# Patient Record
Sex: FEMALE | Race: BLACK OR AFRICAN AMERICAN | NOT HISPANIC OR LATINO | Employment: FULL TIME | URBAN - METROPOLITAN AREA
[De-identification: names, ages, dates, MRNs, and addresses within clinical notes are randomized per-mention and may not be internally consistent; named-entity substitution may affect disease eponyms.]

---

## 2018-01-23 ENCOUNTER — OFFICE VISIT (OUTPATIENT)
Dept: OBSTETRICS AND GYNECOLOGY | Facility: CLINIC | Age: 33
End: 2018-01-23
Payer: COMMERCIAL

## 2018-01-23 VITALS
WEIGHT: 293 LBS | BODY MASS INDEX: 44.41 KG/M2 | HEIGHT: 68 IN | SYSTOLIC BLOOD PRESSURE: 118 MMHG | DIASTOLIC BLOOD PRESSURE: 78 MMHG

## 2018-01-23 DIAGNOSIS — R87.612 LGSIL ON PAP SMEAR OF CERVIX: ICD-10-CM

## 2018-01-23 PROCEDURE — 99999 PR PBB SHADOW E&M-NEW PATIENT-LVL II: CPT | Mod: PBBFAC,,, | Performed by: OBSTETRICS & GYNECOLOGY

## 2018-01-23 PROCEDURE — 99203 OFFICE O/P NEW LOW 30 MIN: CPT | Mod: S$GLB,,, | Performed by: OBSTETRICS & GYNECOLOGY

## 2018-01-23 RX ORDER — LISINOPRIL 20 MG/1
TABLET ORAL
COMMUNITY
Start: 2017-11-21

## 2018-01-23 NOTE — PROGRESS NOTES
Subjective:       Patient ID: Sidney Cardona is a 32 y.o. female.    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   Last exam 2018.   Implant for birth control, not interested in fertility   Discussed options after review of medical records.   Will proceed with LEEP     GYN & OB History  No LMP recorded. Patient is not currently having periods (Reason: Birth Control).   Date of Last Pap: No result found    OB History    Para Term  AB Living   0 0 0 0 0 0   SAB TAB Ectopic Multiple Live Births   0 0 0 0 0             Review of Systems  Review of Systems        Objective:    Physical Exam       Assessment:        1. LGSIL on Pap smear of cervix                Plan:      LEEP near future

## 2018-01-23 NOTE — LETTER
January 23, 2018      Shan Moore MD  [address] [place] [address]           [facility]  [address] [address] [address]  Phone: [phone]  Fax: [phone]          Patient: Sidney Cardona   MR Number: [id number]   YOB: 1985   Date of Visit: 1/23/2018       Dear Dr. Shan Moore:    Thank you for referring Sidney Cardona to me for evaluation. Attached you will find relevant portions of my assessment and plan of care.    If you have questions, please do not hesitate to call me. I look forward to following Sidney Cardona along with you.    Sincerely,    PIPO Quinn MD    Enclosure  CC:  No Recipients    If you would like to receive this communication electronically, please contact [email or web address] or [phone] to request more information on [employer] access.    For providers and/or their staff who would like to refer a patient to [facility], please contact us through our one-stop-shop provider referral line, [facility], at [phone].    If you feel you have received this communication in error or would no longer like to receive these types of communications, please e-mail [email or web address]

## 2018-02-02 ENCOUNTER — PROCEDURE VISIT (OUTPATIENT)
Dept: OBSTETRICS AND GYNECOLOGY | Facility: CLINIC | Age: 33
End: 2018-02-02
Payer: COMMERCIAL

## 2018-02-02 VITALS
SYSTOLIC BLOOD PRESSURE: 136 MMHG | DIASTOLIC BLOOD PRESSURE: 80 MMHG | BODY MASS INDEX: 44.41 KG/M2 | HEIGHT: 68 IN | WEIGHT: 293 LBS

## 2018-02-02 DIAGNOSIS — R87.612 LGSIL ON PAP SMEAR OF CERVIX: Primary | ICD-10-CM

## 2018-02-02 PROCEDURE — 88307 TISSUE EXAM BY PATHOLOGIST: CPT | Mod: 26,,, | Performed by: PATHOLOGY

## 2018-02-02 PROCEDURE — 88342 IMHCHEM/IMCYTCHM 1ST ANTB: CPT | Mod: 26,,, | Performed by: PATHOLOGY

## 2018-02-02 PROCEDURE — 88342 IMHCHEM/IMCYTCHM 1ST ANTB: CPT | Performed by: PATHOLOGY

## 2018-02-02 PROCEDURE — 57522 CONIZATION OF CERVIX: CPT | Mod: S$GLB,,, | Performed by: OBSTETRICS & GYNECOLOGY

## 2018-02-02 PROCEDURE — 88307 TISSUE EXAM BY PATHOLOGIST: CPT | Performed by: PATHOLOGY

## 2018-02-02 NOTE — PROCEDURES
Leep  Date/Time: 2/2/2018 3:20 PM  Performed by: PIPO OCHOA  Authorized by: PIPO OCHOA   Preparation: Patient was prepped and draped in the usual sterile fashion.  Local anesthesia used: yes  Anesthesia: local infiltration (paracervical block )    Anesthesia:  Local anesthesia used: yes  Local Anesthetic: lidocaine 1% with epinephrine  Anesthetic total: 10 mL    Sedation:  Patient sedated: no  Patient tolerance: Patient tolerated the procedure well with no immediate complications        Procedure:  LEEP Excision of the cervix  DX:  Recurrent LGSIL with negative colposcopy ECC   Consents:  Verbal and written consent for LEEP procedure of the cervix reviewed and signed with patient  Anesth:  Lidocaine with epi paracervical block:  10 cc total  Time out done with all participants.   The cervix was visualized with insulated vaginal speculum, colposcope, cervix soaked in 4 % Acetic Acid.   Transformation zone outlined and local placed   The correct size loop selected and excision performed with cut power , blend, set at 50/ 50 elizabeth.     Base of the excised area cauterized with ball cautery attachment    Specimen collected and sent to pathology     EBL:  5 cc  Complication:  None  Condition of Patient:  Stable    Post op instructions:  Pelvic rest for 3 weeks, call for excessive vaginal bleeding , pelvic pain,      Follow up   6 months for pap

## 2018-02-09 ENCOUNTER — TELEPHONE (OUTPATIENT)
Dept: OBSTETRICS AND GYNECOLOGY | Facility: CLINIC | Age: 33
End: 2018-02-09

## 2018-02-09 NOTE — TELEPHONE ENCOUNTER
----- Message from PIPO Quinn MD sent at 2/9/2018  1:25 PM CST -----  Regarding: pathology report  Inform patient that the LEEP contained mild dysplasia  Follow up with 6 months pap with us or Dr Moore

## 2018-02-09 NOTE — PROGRESS NOTES
Communication to Dr Moore  The LEEP_ pathology shows Vero 1.   Recommend follow up with pap in 6 months by us or Dr Moore